# Patient Record
Sex: FEMALE | Race: WHITE
[De-identification: names, ages, dates, MRNs, and addresses within clinical notes are randomized per-mention and may not be internally consistent; named-entity substitution may affect disease eponyms.]

---
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
## 2024-03-05 NOTE — ASMTCMCOM
Problem: Gas Exchange Impaired  Goal: Optimal Gas Exchange  Outcome: Ongoing, Progressing  Intervention: Optimize Oxygenation and Ventilation  Flowsheets (Taken 3/4/2024 1923)  Airway/Ventilation Management: airway patency maintained  Head of Bed (HOB) Positioning:   Patient in no apparent distress. Sat's 98  % on room air.room air. Treatments given Q 6 wa. IS done . Will continue to monitor.    CM Note

 

CM Note                       

Notes:

Case Management asked to meet with patient due to apparent stress roman patient and her mother 

who is present with patient upon arrival to ER.  Chart reviewed.  Patient has history of prior ER 

visits in which similar dynamics have existed between patient and her mother.



I have met with/introduced myself to patient while her mother is out in the waiting room.  Patient 

shares reason for her visit-ankle and knee pain-for which she tells me her mother was very 

insistent that she come to the hospital.  Patient describes her relationship with her mother as 

stressful and states that her mother is very "over bearing".  Patient states that she lives in 

[place] with her grandmother but that her mother is overly involved in her businesss.  Patient also 

tells me that her mother collects SSI benefits on behalf of her (patient's) disability and that her 


mother does not want her to work as it will affect her benefits.  Patient states that she does not 

receive these benefits, they go to her mother. Patient also acknowledges that she is followed at 

[facility] ([facility] 



Patient is 17 years old.  I have encouraged her to advocate for herself as an adult based on what 

she is telling me.  Patient asked me if her mother can get information about her visit and "what I 

say?"  I assure patient that as an adult we cannot and will not share information with anyone 

without her permission.  Patient then tells me that she "has recorded our conversation" and turns 

on her phone screen to stop the recording.  She tells me she was recording her mother and forgot to 


turn it off.



I have called [facility] [phone] and spoken with Fide who assures me that patient has been 

seen recently at [facility] and is current PCP

 

Date Signed:  10/27/2017 12:53 PM

Electronically Signed By:Britney Nickerson RN